# Patient Record
Sex: MALE | Race: WHITE | NOT HISPANIC OR LATINO | Employment: FULL TIME | URBAN - METROPOLITAN AREA
[De-identification: names, ages, dates, MRNs, and addresses within clinical notes are randomized per-mention and may not be internally consistent; named-entity substitution may affect disease eponyms.]

---

## 2021-05-24 ENCOUNTER — HOSPITAL ENCOUNTER (EMERGENCY)
Facility: HOSPITAL | Age: 36
Discharge: HOME | End: 2021-05-24
Attending: EMERGENCY MEDICINE | Admitting: EMERGENCY MEDICINE

## 2021-05-24 ENCOUNTER — APPOINTMENT (OUTPATIENT)
Dept: GENERAL RADIOLOGY | Facility: HOSPITAL | Age: 36
End: 2021-05-24

## 2021-05-24 VITALS
TEMPERATURE: 97.4 F | SYSTOLIC BLOOD PRESSURE: 116 MMHG | OXYGEN SATURATION: 97 % | HEIGHT: 71 IN | RESPIRATION RATE: 20 BRPM | BODY MASS INDEX: 29.14 KG/M2 | WEIGHT: 208.11 LBS | DIASTOLIC BLOOD PRESSURE: 81 MMHG | HEART RATE: 60 BPM

## 2021-05-24 DIAGNOSIS — B34.8 RHINOVIRUS INFECTION: Primary | ICD-10-CM

## 2021-05-24 LAB
B PARAPERT DNA SPEC QL NAA+PROBE: NOT DETECTED
B PERT DNA SPEC QL NAA+PROBE: NOT DETECTED
C PNEUM DNA NPH QL NAA+NON-PROBE: NOT DETECTED
FLUAV SUBTYP SPEC NAA+PROBE: NOT DETECTED
FLUBV RNA ISLT QL NAA+PROBE: NOT DETECTED
HADV DNA SPEC NAA+PROBE: NOT DETECTED
HCOV 229E RNA SPEC QL NAA+PROBE: NOT DETECTED
HCOV HKU1 RNA SPEC QL NAA+PROBE: NOT DETECTED
HCOV NL63 RNA SPEC QL NAA+PROBE: NOT DETECTED
HCOV OC43 RNA SPEC QL NAA+PROBE: NOT DETECTED
HMPV RNA NPH QL NAA+NON-PROBE: NOT DETECTED
HPIV1 RNA SPEC QL NAA+PROBE: NOT DETECTED
HPIV2 RNA SPEC QL NAA+PROBE: NOT DETECTED
HPIV3 RNA NPH QL NAA+PROBE: NOT DETECTED
HPIV4 P GENE NPH QL NAA+PROBE: NOT DETECTED
M PNEUMO IGG SER IA-ACNC: NOT DETECTED
QT INTERVAL: 397 MS
RHINOVIRUS RNA SPEC NAA+PROBE: DETECTED
RSV RNA NPH QL NAA+NON-PROBE: NOT DETECTED
SARS-COV-2 RNA NPH QL NAA+NON-PROBE: NOT DETECTED

## 2021-05-24 PROCEDURE — 0202U NFCT DS 22 TRGT SARS-COV-2: CPT | Performed by: EMERGENCY MEDICINE

## 2021-05-24 PROCEDURE — 71045 X-RAY EXAM CHEST 1 VIEW: CPT

## 2021-05-24 PROCEDURE — 99283 EMERGENCY DEPT VISIT LOW MDM: CPT

## 2021-05-24 PROCEDURE — 93005 ELECTROCARDIOGRAM TRACING: CPT | Performed by: EMERGENCY MEDICINE

## 2021-05-24 RX ORDER — PREDNISONE 20 MG/1
60 TABLET ORAL DAILY
Qty: 15 TABLET | Refills: 0 | Status: SHIPPED | OUTPATIENT
Start: 2021-05-24 | End: 2021-05-29

## 2021-05-25 LAB — QT INTERVAL: 412 MS

## 2021-07-09 PROCEDURE — 99283 EMERGENCY DEPT VISIT LOW MDM: CPT

## 2021-07-09 PROCEDURE — 96374 THER/PROPH/DIAG INJ IV PUSH: CPT

## 2021-07-10 ENCOUNTER — HOSPITAL ENCOUNTER (EMERGENCY)
Facility: HOSPITAL | Age: 36
Discharge: HOME | End: 2021-07-10
Admitting: EMERGENCY MEDICINE

## 2021-07-10 ENCOUNTER — APPOINTMENT (OUTPATIENT)
Dept: CT IMAGING | Facility: HOSPITAL | Age: 36
End: 2021-07-10

## 2021-07-10 VITALS
BODY MASS INDEX: 29.1 KG/M2 | DIASTOLIC BLOOD PRESSURE: 71 MMHG | HEIGHT: 71 IN | RESPIRATION RATE: 15 BRPM | WEIGHT: 207.89 LBS | TEMPERATURE: 98.5 F | OXYGEN SATURATION: 95 % | HEART RATE: 73 BPM | SYSTOLIC BLOOD PRESSURE: 123 MMHG

## 2021-07-10 DIAGNOSIS — D72.829 LEUKOCYTOSIS, UNSPECIFIED TYPE: ICD-10-CM

## 2021-07-10 DIAGNOSIS — R10.30 LOWER ABDOMINAL PAIN: Primary | ICD-10-CM

## 2021-07-10 LAB
ALBUMIN SERPL-MCNC: 4.2 G/DL (ref 3.5–5.2)
ALBUMIN/GLOB SERPL: 1.8 G/DL
ALP SERPL-CCNC: 49 U/L (ref 39–117)
ALT SERPL W P-5'-P-CCNC: 14 U/L (ref 1–41)
ANION GAP SERPL CALCULATED.3IONS-SCNC: 11 MMOL/L (ref 5–15)
AST SERPL-CCNC: 24 U/L (ref 1–40)
BASOPHILS # BLD AUTO: 0 10*3/MM3 (ref 0–0.2)
BASOPHILS NFR BLD AUTO: 0.3 % (ref 0–1.5)
BILIRUB SERPL-MCNC: 0.4 MG/DL (ref 0–1.2)
BILIRUB UR QL STRIP: NEGATIVE
BUN SERPL-MCNC: 14 MG/DL (ref 6–20)
BUN/CREAT SERPL: 13.6 (ref 7–25)
CALCIUM SPEC-SCNC: 9.1 MG/DL (ref 8.6–10.5)
CHLORIDE SERPL-SCNC: 102 MMOL/L (ref 98–107)
CLARITY UR: CLEAR
CO2 SERPL-SCNC: 26 MMOL/L (ref 22–29)
COLOR UR: YELLOW
CREAT SERPL-MCNC: 1.03 MG/DL (ref 0.76–1.27)
DEPRECATED RDW RBC AUTO: 42 FL (ref 37–54)
EOSINOPHIL # BLD AUTO: 0.2 10*3/MM3 (ref 0–0.4)
EOSINOPHIL NFR BLD AUTO: 1 % (ref 0.3–6.2)
ERYTHROCYTE [DISTWIDTH] IN BLOOD BY AUTOMATED COUNT: 13.1 % (ref 12.3–15.4)
GFR SERPL CREATININE-BSD FRML MDRD: 83 ML/MIN/1.73
GLOBULIN UR ELPH-MCNC: 2.3 GM/DL
GLUCOSE SERPL-MCNC: 118 MG/DL (ref 65–99)
GLUCOSE UR STRIP-MCNC: NEGATIVE MG/DL
HCT VFR BLD AUTO: 42.7 % (ref 37.5–51)
HGB BLD-MCNC: 15.1 G/DL (ref 13–17.7)
HGB UR QL STRIP.AUTO: NEGATIVE
KETONES UR QL STRIP: NEGATIVE
LEUKOCYTE ESTERASE UR QL STRIP.AUTO: NEGATIVE
LIPASE SERPL-CCNC: 26 U/L (ref 13–60)
LYMPHOCYTES # BLD AUTO: 1.9 10*3/MM3 (ref 0.7–3.1)
LYMPHOCYTES NFR BLD AUTO: 12.7 % (ref 19.6–45.3)
MCH RBC QN AUTO: 32.8 PG (ref 26.6–33)
MCHC RBC AUTO-ENTMCNC: 35.3 G/DL (ref 31.5–35.7)
MCV RBC AUTO: 93 FL (ref 79–97)
MONOCYTES # BLD AUTO: 1.4 10*3/MM3 (ref 0.1–0.9)
MONOCYTES NFR BLD AUTO: 9.4 % (ref 5–12)
NEUTROPHILS NFR BLD AUTO: 11.4 10*3/MM3 (ref 1.7–7)
NEUTROPHILS NFR BLD AUTO: 76.6 % (ref 42.7–76)
NITRITE UR QL STRIP: NEGATIVE
NRBC BLD AUTO-RTO: 0 /100 WBC (ref 0–0.2)
PH UR STRIP.AUTO: 6 [PH] (ref 5–8)
PLATELET # BLD AUTO: 162 10*3/MM3 (ref 140–450)
PMV BLD AUTO: 9.7 FL (ref 6–12)
POTASSIUM SERPL-SCNC: 3.9 MMOL/L (ref 3.5–5.2)
PROT SERPL-MCNC: 6.5 G/DL (ref 6–8.5)
PROT UR QL STRIP: NEGATIVE
RBC # BLD AUTO: 4.59 10*6/MM3 (ref 4.14–5.8)
SODIUM SERPL-SCNC: 139 MMOL/L (ref 136–145)
SP GR UR STRIP: 1.02 (ref 1–1.03)
UROBILINOGEN UR QL STRIP: NORMAL
WBC # BLD AUTO: 14.9 10*3/MM3 (ref 3.4–10.8)

## 2021-07-10 PROCEDURE — 25010000002 MORPHINE PER 10 MG: Performed by: NURSE PRACTITIONER

## 2021-07-10 PROCEDURE — 0 IOPAMIDOL PER 1 ML: Performed by: NURSE PRACTITIONER

## 2021-07-10 PROCEDURE — 74177 CT ABD & PELVIS W/CONTRAST: CPT

## 2021-07-10 PROCEDURE — 81003 URINALYSIS AUTO W/O SCOPE: CPT | Performed by: EMERGENCY MEDICINE

## 2021-07-10 PROCEDURE — 83690 ASSAY OF LIPASE: CPT | Performed by: NURSE PRACTITIONER

## 2021-07-10 PROCEDURE — 85025 COMPLETE CBC W/AUTO DIFF WBC: CPT | Performed by: NURSE PRACTITIONER

## 2021-07-10 PROCEDURE — 80053 COMPREHEN METABOLIC PANEL: CPT | Performed by: NURSE PRACTITIONER

## 2021-07-10 PROCEDURE — 96374 THER/PROPH/DIAG INJ IV PUSH: CPT

## 2021-07-10 RX ORDER — MORPHINE SULFATE 4 MG/ML
4 INJECTION, SOLUTION INTRAMUSCULAR; INTRAVENOUS ONCE
Status: COMPLETED | OUTPATIENT
Start: 2021-07-10 | End: 2021-07-10

## 2021-07-10 RX ORDER — DICYCLOMINE HCL 20 MG
20 TABLET ORAL EVERY 8 HOURS PRN
Qty: 10 TABLET | Refills: 0 | Status: SHIPPED | OUTPATIENT
Start: 2021-07-10

## 2021-07-10 RX ORDER — SODIUM CHLORIDE 0.9 % (FLUSH) 0.9 %
10 SYRINGE (ML) INJECTION AS NEEDED
Status: DISCONTINUED | OUTPATIENT
Start: 2021-07-10 | End: 2021-07-10 | Stop reason: HOSPADM

## 2021-07-10 RX ADMIN — MORPHINE SULFATE 4 MG: 4 INJECTION INTRAVENOUS at 02:49

## 2021-07-10 RX ADMIN — IOPAMIDOL 100 ML: 755 INJECTION, SOLUTION INTRAVENOUS at 03:39

## 2021-07-10 NOTE — ED PROVIDER NOTES
Subjective   Patient presents with:  Abdominal Pain: lower abdominal pain x 5 hours    Provider, No Known  No LMP for male patient.   -- Augmentin (Amoxicillin-Pot Clavulanate) -- Hives  Onset: 5 hours  Provoking: None  Quality: Sharp  Region & Radiation: Lower abdomen  Severity: Moderate  Time: Consistent  Context:  Patient is a 36-year-old male, presents emergency department complaint of abdominal pain onset 5 hours ago.  Patient reports associated diarrhea.  No hematochezia or melena.  No hematemesis or coffee-ground emesis.  No vomiting.  No urinary symptoms.  Patient reports his pain radiates across his lower abdomen.  He does not have any testicular pain or swelling.  No concern for STDs.  Last bowel movement was today.    Patient denies any recent travel.  No suspicious foods.          Review of Systems   Constitutional: Negative for chills and fever.   Respiratory: Negative for chest tightness and shortness of breath.    Cardiovascular: Negative for chest pain.   Gastrointestinal: Positive for abdominal pain and diarrhea. Negative for nausea and vomiting.   Genitourinary: Negative for flank pain.   Musculoskeletal: Negative for back pain and neck pain.   Skin: Negative for color change and rash.   Neurological: Negative for light-headedness.       No past medical history on file.    Allergies   Allergen Reactions   • Augmentin [Amoxicillin-Pot Clavulanate] Hives       No past surgical history on file.    No family history on file.    Social History     Socioeconomic History   • Marital status: Significant Other     Spouse name: Not on file   • Number of children: Not on file   • Years of education: Not on file   • Highest education level: Not on file           Objective   Physical Exam  Vitals and nursing note reviewed.   Constitutional:       General: He is not in acute distress.     Appearance: He is well-developed. He is not ill-appearing, toxic-appearing or diaphoretic.   HENT:      Head: Normocephalic  "and atraumatic.      Mouth/Throat:      Mouth: Mucous membranes are moist.      Pharynx: Oropharynx is clear.   Eyes:      Extraocular Movements: Extraocular movements intact.      Pupils: Pupils are equal, round, and reactive to light.   Cardiovascular:      Rate and Rhythm: Normal rate and regular rhythm.      Heart sounds: No murmur heard.   No friction rub. No gallop.    Pulmonary:      Effort: Pulmonary effort is normal.      Breath sounds: Normal breath sounds.   Abdominal:      General: Abdomen is protuberant. Bowel sounds are normal. There is no distension.      Palpations: Abdomen is soft.      Tenderness: There is abdominal tenderness in the right lower quadrant, suprapubic area and left lower quadrant. There is no guarding or rebound. Negative signs include Arrieta's sign and Rovsing's sign.   Skin:     General: Skin is warm.   Neurological:      General: No focal deficit present.      Mental Status: He is alert and oriented to person, place, and time.   Psychiatric:         Mood and Affect: Mood normal.         Behavior: Behavior normal.         Procedures           ED Course  ED Course as of Jul 10 0418   Sat Jul 10, 2021   0412 Pt feels improved. Abdominal exam improved. No peritoneal signs.     [LB]      ED Course User Index  [LB] Carmelita Ward M, APRN      /71   Pulse 73   Temp 98.5 °F (36.9 °C) (Oral)   Resp 15   Ht 180.3 cm (71\")   Wt 94.3 kg (207 lb 14.3 oz)   SpO2 95%   BMI 29.00 kg/m²   Labs Reviewed   COMPREHENSIVE METABOLIC PANEL - Abnormal; Notable for the following components:       Result Value    Glucose 118 (*)     All other components within normal limits    Narrative:     GFR Normal >60  Chronic Kidney Disease <60  Kidney Failure <15     CBC WITH AUTO DIFFERENTIAL - Abnormal; Notable for the following components:    WBC 14.90 (*)     Neutrophil % 76.6 (*)     Lymphocyte % 12.7 (*)     Neutrophils, Absolute 11.40 (*)     Monocytes, Absolute 1.40 (*)     All other components " within normal limits   URINALYSIS W/ CULTURE IF INDICATED - Normal    Narrative:     Urine microscopic not indicated.   LIPASE - Normal   CBC AND DIFFERENTIAL    Narrative:     The following orders were created for panel order CBC & Differential.  Procedure                               Abnormality         Status                     ---------                               -----------         ------                     CBC Auto Differential[[id number]]        Abnormal            Final result                 Please view results for these tests on the individual orders.     Medications   sodium chloride 0.9 % flush 10 mL (has no administration in time range)   Morphine sulfate (PF) injection 4 mg (4 mg Intravenous Given 7/10/21 3356)   iopamidol (ISOVUE-370) 76 % injection 100 mL (100 mL Intravenous Given 7/10/21 7840)     No radiology results for the last day                                       MDM  Number of Diagnoses or Management Options  Leukocytosis, unspecified type  Lower abdominal pain  Diagnosis management comments: Appropriate PPE was worn during the duration of the care for this patient while in the emergency department per [facility] Policy    ----Differentials: Constipation, diverticulitis, bowel obstruction  This list is not all inclusive and does not constitute the entireity of considered causes.     ----Lab and imaging reviewd by me, imaging interpreted per radiologist and independly viewed by me:l eukocytosis.  No acute findings on CT.  Lab work otherwise unremarkable.    ED  Course----Patient was brought back to the emergency department room for evaluation and placed on appropriate monitoring.      Vital signs have  been reviewed. Patient is afebrile. Non toxic in appearance. Alert and oriented to person, place, time and situation.  Work-up here in the ED is reassuring, he does have a leukocytosis at 10.9, his other work-up is unremarkable.  On reexam his abdominal tenderness is improved.  He  has no evidence for an acute abdomen.  I have advised follow-up with the PCP for recheck of his white blood cell count.  Given information to follow-up with the PCP and get established for care.    I spoke with the patient at the bedside regarding their plan of care, discharge instruction, home care, prescriptions, and importance follow-up.  We discussed test results at the bedside, including incidental abnormal labs, radiological findings, understands need for follow-up with primary care or specialist if indicated.      Patient was made aware of indications to return to the emergency department.  Patient agrees with the current plan of care for discharge, verbalized understanding of all instructions    Pt is aware that discharge does not mean that nothing is wrong but it indicates no emergency is present and they must continue care with follow-up as given below or physician of their choice                               Amount and/or Complexity of Data Reviewed  Clinical lab tests: reviewed  Tests in the radiology section of CPT®: reviewed  Decide to obtain previous medical records or to obtain history from someone other than the patient: yes    Patient Progress  Patient progress: stable      Final diagnoses:   Lower abdominal pain   Leukocytosis, unspecified type       ED Disposition  ED Disposition     ED Disposition Condition Comment    Discharge Stable           PATIENT [facility] [address] [phone]  Schedule an appointment as soon as possible for a visit   Call for assistance with follow up with Primary care provider-call tomorrow.    [facility] EMERGENCY DEPARTMENT  [address] [address] [phone]    As needed, If symptoms worsen    Scottie Durham MD  [address] [facility] [address] [phone]    Schedule an appointment as soon as possible for a visit   for primary care follow up         Medication List      New Prescriptions     dicyclomine 20 MG tablet  Commonly known as: BENTYL  Take 1 tablet by mouth Every 8 (Eight) Hours As Needed (abdominal pain).           Where to Get Your Medications      These medications were sent to [id number] - [place], [place] - [address] AT [address] - [phone]  - [phone] FX  [address] [place] [address]    Phone: [phone]   · dicyclomine 20 MG tablet          Carmeliat Ward, APRN  07/10/21 0418     Roderick Navarro(Attending)

## 2021-07-10 NOTE — DISCHARGE INSTRUCTIONS
Please follow-up with your primary care provider, call tomorrow for an appointment, if you do not have a primary care provider, please use the patient connection above to us to help establish care, please call as soon as possible.    Return the emergency department for new or worsening symptoms    Take medications as prescribed, any changes will be noted on your discharge instruction    Please ensure you follow up with primary care for a recheck of your white blood cell count

## 2021-07-26 PROCEDURE — 99283 EMERGENCY DEPT VISIT LOW MDM: CPT

## 2021-07-27 ENCOUNTER — HOSPITAL ENCOUNTER (EMERGENCY)
Facility: HOSPITAL | Age: 36
Discharge: HOME | End: 2021-07-27
Admitting: EMERGENCY MEDICINE

## 2021-07-27 VITALS
HEIGHT: 71 IN | WEIGHT: 203.26 LBS | BODY MASS INDEX: 28.46 KG/M2 | RESPIRATION RATE: 16 BRPM | DIASTOLIC BLOOD PRESSURE: 81 MMHG | HEART RATE: 72 BPM | TEMPERATURE: 98.6 F | SYSTOLIC BLOOD PRESSURE: 138 MMHG | OXYGEN SATURATION: 99 %

## 2021-07-27 DIAGNOSIS — L23.7 POISON IVY DERMATITIS: Primary | ICD-10-CM

## 2021-07-27 PROCEDURE — 63710000001 PREDNISONE PER 1 MG: Performed by: PHYSICIAN ASSISTANT

## 2021-07-27 RX ORDER — PREDNISONE 20 MG/1
40 TABLET ORAL DAILY
Qty: 10 TABLET | Refills: 0 | Status: SHIPPED | OUTPATIENT
Start: 2021-07-27

## 2021-07-27 RX ORDER — CLOBETASOL PROPIONATE 0.5 MG/G
CREAM TOPICAL 2 TIMES DAILY
Qty: 30 G | Refills: 0 | Status: SHIPPED | OUTPATIENT
Start: 2021-07-27

## 2021-07-27 RX ORDER — FAMOTIDINE 20 MG/1
20 TABLET, FILM COATED ORAL ONCE
Status: COMPLETED | OUTPATIENT
Start: 2021-07-27 | End: 2021-07-27

## 2021-07-27 RX ORDER — PREDNISONE 20 MG/1
40 TABLET ORAL ONCE
Status: COMPLETED | OUTPATIENT
Start: 2021-07-27 | End: 2021-07-27

## 2021-07-27 RX ORDER — FAMOTIDINE 40 MG/1
40 TABLET, FILM COATED ORAL DAILY
Qty: 10 TABLET | Refills: 0 | Status: SHIPPED | OUTPATIENT
Start: 2021-07-27

## 2021-07-27 RX ADMIN — FAMOTIDINE 20 MG: 20 TABLET, FILM COATED ORAL at 02:03

## 2021-07-27 RX ADMIN — PREDNISONE 40 MG: 20 TABLET ORAL at 02:03

## 2022-07-26 ENCOUNTER — HOSPITAL ENCOUNTER (EMERGENCY)
Facility: HOSPITAL | Age: 37
Discharge: LEFT BEFORE DISCHARGE | End: 2022-07-26

## 2022-07-26 VITALS
HEIGHT: 71 IN | DIASTOLIC BLOOD PRESSURE: 78 MMHG | TEMPERATURE: 97 F | OXYGEN SATURATION: 99 % | BODY MASS INDEX: 28.46 KG/M2 | HEART RATE: 57 BPM | WEIGHT: 203.26 LBS | SYSTOLIC BLOOD PRESSURE: 114 MMHG | RESPIRATION RATE: 18 BRPM

## 2022-07-26 PROCEDURE — 99211 OFF/OP EST MAY X REQ PHY/QHP: CPT

## 2023-04-26 ENCOUNTER — HOSPITAL ENCOUNTER (EMERGENCY)
Facility: HOSPITAL | Age: 38
Discharge: HOME | End: 2023-04-26
Attending: EMERGENCY MEDICINE
Payer: MEDICAID

## 2023-04-26 VITALS
RESPIRATION RATE: 16 BRPM | OXYGEN SATURATION: 96 % | SYSTOLIC BLOOD PRESSURE: 117 MMHG | DIASTOLIC BLOOD PRESSURE: 73 MMHG | BODY MASS INDEX: 28.98 KG/M2 | TEMPERATURE: 98.6 F | HEART RATE: 61 BPM | WEIGHT: 207.01 LBS | HEIGHT: 71 IN

## 2023-04-26 DIAGNOSIS — L03.811 CELLULITIS OF HEAD EXCEPT FACE: ICD-10-CM

## 2023-04-26 DIAGNOSIS — L02.91 ABSCESS: Primary | ICD-10-CM

## 2023-04-26 PROCEDURE — 87205 SMEAR GRAM STAIN: CPT | Performed by: EMERGENCY MEDICINE

## 2023-04-26 PROCEDURE — 0 LIDOCAINE 1 % SOLUTION: Performed by: PHYSICIAN ASSISTANT

## 2023-04-26 PROCEDURE — 87186 SC STD MICRODIL/AGAR DIL: CPT | Performed by: EMERGENCY MEDICINE

## 2023-04-26 PROCEDURE — 99283 EMERGENCY DEPT VISIT LOW MDM: CPT

## 2023-04-26 PROCEDURE — 87070 CULTURE OTHR SPECIMN AEROBIC: CPT | Performed by: EMERGENCY MEDICINE

## 2023-04-26 RX ORDER — LIDOCAINE HYDROCHLORIDE 10 MG/ML
10 INJECTION, SOLUTION INFILTRATION; PERINEURAL ONCE
Status: COMPLETED | OUTPATIENT
Start: 2023-04-26 | End: 2023-04-26

## 2023-04-26 RX ORDER — DOXYCYCLINE 100 MG/1
100 TABLET ORAL ONCE
Status: COMPLETED | OUTPATIENT
Start: 2023-04-26 | End: 2023-04-26

## 2023-04-26 RX ORDER — LIDOCAINE 40 MG/G
1 CREAM TOPICAL AS NEEDED
Status: DISCONTINUED | OUTPATIENT
Start: 2023-04-26 | End: 2023-04-26 | Stop reason: HOSPADM

## 2023-04-26 RX ORDER — DOXYCYCLINE 100 MG/1
100 TABLET ORAL 2 TIMES DAILY
Qty: 14 TABLET | Refills: 0 | Status: SHIPPED | OUTPATIENT
Start: 2023-04-26

## 2023-04-26 RX ADMIN — DOXYCYCLINE 100 MG: 100 TABLET ORAL at 20:07

## 2023-04-26 RX ADMIN — LIDOCAINE 4% 1 APPLICATION: 4 CREAM TOPICAL at 19:19

## 2023-04-26 RX ADMIN — LIDOCAINE HYDROCHLORIDE 10 ML: 10 INJECTION, SOLUTION INFILTRATION; PERINEURAL at 19:47

## 2023-04-26 NOTE — ED PROVIDER NOTES
Subjective   History of Present Illness     Patient is a 37-year-old male comes in complaining of apparent abscess of the right base of skull neck area.  Patient states he noticed that yesterday as just within the hairline in the back.  Patient states he will often get pimples after having haircuts in this area but never this bad.  Patient states that he was putting on his shirt and area had brushed up against the apparent abscess and cause severe pain.  Patient denies any fever, chills, nausea, vomiting.  Patient denies any history of diabetes.  Patient denies any history of allergy to lidocaine or any other antibiotic other than Augmentin.    Upon chart review, no prior notes available to review although patient was last seen in ER on 7/10/2021 for abdominal pain.       Review of Systems   Constitutional: Negative for chills, fatigue and fever.   HENT: Negative for congestion, sore throat, tinnitus and trouble swallowing.    Eyes: Negative for photophobia, discharge and visual disturbance.   Respiratory: Negative for cough, shortness of breath and wheezing.    Cardiovascular: Negative for chest pain, palpitations and leg swelling.   Gastrointestinal: Negative for abdominal pain, blood in stool, diarrhea, nausea and vomiting.   Genitourinary: Negative for dysuria, flank pain, frequency and urgency.   Musculoskeletal: Negative for arthralgias and myalgias.   Skin: Positive for rash and wound.   Neurological: Negative for dizziness and headaches.   Psychiatric/Behavioral: Negative for confusion.       History reviewed. No pertinent past medical history.    Allergies   Allergen Reactions   • Augmentin [Amoxicillin-Pot Clavulanate] Hives       History reviewed. No pertinent surgical history.    History reviewed. No pertinent family history.    Social History     Socioeconomic History   • Marital status: Significant Other           Objective   Physical Exam  Vitals and nursing note reviewed.   Constitutional:        General: He is not in acute distress.     Appearance: Normal appearance. He is well-developed. He is not diaphoretic.   HENT:      Head: Normocephalic and atraumatic.      Right Ear: External ear normal.      Left Ear: External ear normal.      Nose: Nose normal.      Mouth/Throat:      Mouth: Mucous membranes are moist.   Eyes:      Extraocular Movements: Extraocular movements intact.      Conjunctiva/sclera: Conjunctivae normal.      Pupils: Pupils are equal, round, and reactive to light.   Cardiovascular:      Rate and Rhythm: Normal rate and regular rhythm.      Pulses: Normal pulses.      Heart sounds: Normal heart sounds.      Comments: S1, S2 audible.  Pulmonary:      Effort: Pulmonary effort is normal. No respiratory distress.      Breath sounds: Normal breath sounds. No wheezing, rhonchi or rales.      Comments: On room air.  Abdominal:      General: Bowel sounds are normal. There is no distension.      Palpations: Abdomen is soft.      Tenderness: There is no abdominal tenderness. There is no guarding or rebound.      Hernia: No hernia is present.   Musculoskeletal:         General: No tenderness or deformity. Normal range of motion.      Cervical back: Normal range of motion.      Right lower leg: No edema.      Left lower leg: No edema.   Skin:     General: Skin is warm.      Capillary Refill: Capillary refill takes less than 2 seconds.      Findings: No erythema or rash.      Comments: Patient has about a 2 cm in diameter area of erythema and fluctuance as well as central induration.  This area is movable above the bones of the scalp.  Patient appears to have old healed areas of folliculitis next to this of various sizes.   Neurological:      General: No focal deficit present.      Mental Status: He is alert and oriented to person, place, and time.      Cranial Nerves: No cranial nerve deficit.   Psychiatric:         Mood and Affect: Mood normal.         Behavior: Behavior normal.         Incision &  "Drainage    Date/Time: 4/26/2023 9:39 PM  Performed by: Moses Melissa PA  Authorized by: Antonio Tyson MD     Consent:     Consent obtained:  Verbal    Consent given by:  Patient    Risks, benefits, and alternatives were discussed: yes      Risks discussed:  Damage to other organs, bleeding, incomplete drainage, infection and pain    Alternatives discussed:  No treatment  Universal protocol:     Patient identity confirmed:  Verbally with patient and arm band  Location:     Type:  Abscess    Size:  2 cm    Location:  Head    Head location:  Scalp  Pre-procedure details:     Skin preparation:  Antiseptic wash  Sedation:     Sedation type:  Moderate sedation  Procedure type:     Complexity:  Simple  Procedure details:     Incision types:  Single straight    Incision depth:  Subcutaneous    Wound management:  Probed and deloculated    Drainage:  Bloody and purulent    Drainage amount:  Moderate    Wound treatment:  Wound left open  Post-procedure details:     Procedure completion:  Tolerated with difficulty               ED Course      /73   Pulse 61   Temp 98.6 °F (37 °C) (Temporal)   Resp 16   Ht 180.3 cm (71\")   Wt 93.9 kg (207 lb 0.2 oz)   SpO2 96%   BMI 28.87 kg/m²   Labs Reviewed   WOUND CULTURE     No radiology results for the last day                                       MDM     Differential diagnosis: Cellulitis, abscess, cyst, not ment to be an all-inclusive list    Chart review: Upon chart review, I had seen the patient back in July 2021 for poison ivy rash.  On the ER notes to review at this time.    EKG:  Not indicated    Imaging: Considered but not indicated  Labs: Wound culture obtained.    Vitals:  /73   Pulse 61   Temp 98.6 °F (37 °C) (Temporal)   Resp 16   Ht 180.3 cm (71\")   Wt 93.9 kg (207 lb 0.2 oz)   SpO2 96%   BMI 28.87 kg/m²     Medications given:    Medications   lidocaine (LMX) 4 % cream 1 application (1 application Topical Given 4/26/23 1919)   lidocaine (XYLOCAINE) 1 " % injection 10 mL (10 mL Injection Given 4/26/23 1947)   doxycycline (ADOXA) tablet 100 mg (100 mg Oral Given 4/26/23 2007)       Procedures:  I and D above.    MDM: Patient is a 37-year-old male who comes in complaining of pain and swelling at the hairline on the back of his head/neck.  Patient states he noticed it yesterday.  Patient was given LMX cream as the area of abscess is in a sensitive area.  Patient was also given local lidocaine as well.  Patient had I&D performed successfully as above procedure described.  Patient had no fever, chills, nausea or vomiting.  Patient is given doxycycline here and sent home on a 7-day course.  Wound culture obtained as well.  Patient given strict return precautions and voiced understanding. See full discharge instructions for further details.  Results and plan discussed with patient and is agreeable with plan.    Final diagnoses:   Abscess   Cellulitis of head except face       ED Disposition  ED Disposition     ED Disposition   Discharge    Condition   Stable    Comment   --             [facility] EMERGENCY DEPARTMENT  [address] [facility] [address] [phone]  Go in 1 day  As needed, If symptoms worsen    PATIENT CONNECTION - [facility] [address]  [phone]  Call in 1 week  For wound re-check    Martina Rodriguez MD  [address] [place] [address] [phone]    Schedule an appointment as soon as possible for a visit in 1 week  For wound re-check, As needed         Medication List      New Prescriptions    doxycycline 100 MG tablet  Commonly known as: ADOXA  Take 1 tablet by mouth 2 (Two) Times a Day.           Where to Get Your Medications      These medications were sent to [id number] - [place], [place] - [address] AT [address] - [phone]  - [phone] FX  [address] [place] [address]    Phone: [phone]   · doxycycline 100 MG tablet          Moses Melissa  PA  04/26/23 2140       Moses Melissa PA  04/26/23 2141

## 2023-04-26 NOTE — ED NOTES
Patient reports he woke up from a nap and noticed a swollen painful area at his hairline on the back of his head. Patient reports sometimes he gets ingrown hairs but doesn't think this is that. Patient reports pain at site. Red, swollen area noted to back of head at hairline on the right side about the size of a nickel.

## 2023-04-27 NOTE — DISCHARGE INSTRUCTIONS
Please take antibiotic to completion even if feeling better.  Please use warm compresses 3-4 times a day for 10 to 15 minutes at a time of a warm wash rag holding pressure to the area.  Please come back to the ER if spike high fever or develop vomiting as you will need reevaluation at time.  Please call the patient connection number above to set up a primary care provider if you do not have a primary care provider already.  Please follow-up with Dr. Rodriguez with general surgery if symptoms persist or worsen.

## 2023-04-28 LAB
BACTERIA SPEC AEROBE CULT: ABNORMAL
GRAM STN SPEC: ABNORMAL
GRAM STN SPEC: ABNORMAL

## 2023-04-28 RX ORDER — CEFDINIR 300 MG/1
300 CAPSULE ORAL 2 TIMES DAILY
Qty: 14 CAPSULE | Refills: 0 | Status: SHIPPED | OUTPATIENT
Start: 2023-04-28

## 2023-04-28 NOTE — PROGRESS NOTES
Patient was seen in ED on 4/26 for abscess of right base skull neck area. I&D was performed during ED visit. Patient wound culture resulted with P. mirabilis. Susceptible to Cephalosporins (3rd gen). Patient was given Rx for doxycycline. Therapy is insufficient coverage. Discussed with Wilfred Davey. New prescription for cefdinir called to the patient's preferred pharmacy: [facility]. Spoke with patient who was busy at work and asked to call back after 5 pm today. Will call the patient after 5 pm today and  on stopping doxycycline and initiating cefdinir.     4/28 @ 18:26: Attempted to get ahold of patient - no answer, left voicemail explaining that antimicrobial therapy has been changed and that he needs to  new antibiotic and stop taking the old one. Provided number to call back if questions arise.    Bia Peralta, PharmD, BCPS  04/28/23 18:25 EDT      Microbiology Results (last 10 days)       Procedure Component Value - Date/Time    Wound Culture - Swab, Neck [[id number]]  (Abnormal)  (Susceptibility) Collected: 04/26/23 1954    Lab Status: Final result Specimen: Swab from Neck Updated: 04/28/23 0646     Wound Culture Scant growth (1+) Proteus mirabilis     Gram Stain Many (4+) WBCs per low power field      No organisms seen    Susceptibility        Proteus mirabilis      ANA PAULA      Ampicillin Susceptible      Ampicillin + Sulbactam Susceptible      Cefepime Susceptible      Ceftazidime Susceptible      Ceftriaxone Susceptible      Gentamicin Susceptible      Levofloxacin Susceptible      Piperacillin + Tazobactam Susceptible      Tetracycline Resistant      Trimethoprim + Sulfamethoxazole Susceptible                       Susceptibility Comments       Proteus mirabilis    Cefazolin sensitivity will not be reported for Enterobacteriaceae in non-urine isolates. If cefazolin is preferred, please call the microbiology lab to request an E-test.  With the exception of urinary-sourced  infections, aminoglycosides should not be used as monotherapy.                       Bia Peralta, PharmD  4/28/2023 11:14 EDT

## 2023-05-13 ENCOUNTER — HOSPITAL ENCOUNTER (EMERGENCY)
Facility: HOSPITAL | Age: 38
Discharge: HOME | End: 2023-05-13
Attending: EMERGENCY MEDICINE
Payer: MEDICAID

## 2023-05-13 VITALS
TEMPERATURE: 97.5 F | OXYGEN SATURATION: 98 % | WEIGHT: 209.22 LBS | SYSTOLIC BLOOD PRESSURE: 139 MMHG | DIASTOLIC BLOOD PRESSURE: 93 MMHG | HEIGHT: 71 IN | HEART RATE: 61 BPM | BODY MASS INDEX: 29.29 KG/M2 | RESPIRATION RATE: 18 BRPM

## 2023-05-13 DIAGNOSIS — T15.02XA FOREIGN BODY OF LEFT CORNEA, INITIAL ENCOUNTER: Primary | ICD-10-CM

## 2023-05-13 PROCEDURE — 99283 EMERGENCY DEPT VISIT LOW MDM: CPT

## 2023-05-13 RX ORDER — SULFACETAMIDE SODIUM 100 MG/ML
1 SOLUTION/ DROPS OPHTHALMIC
Status: DISCONTINUED | OUTPATIENT
Start: 2023-05-13 | End: 2023-05-13 | Stop reason: HOSPADM

## 2023-05-13 RX ORDER — PROPARACAINE HYDROCHLORIDE 5 MG/ML
2 SOLUTION/ DROPS OPHTHALMIC ONCE
Status: COMPLETED | OUTPATIENT
Start: 2023-05-13 | End: 2023-05-13

## 2023-05-13 RX ADMIN — PROPARACAINE HYDROCHLORIDE 2 DROP: 5 SOLUTION/ DROPS OPHTHALMIC at 01:15

## 2023-05-13 RX ADMIN — FLUORESCEIN SODIUM 1 STRIP: 1 STRIP OPHTHALMIC at 01:15

## 2023-05-13 RX ADMIN — SULFACETAMIDE SODIUM 1 DROP: 100 SOLUTION/ DROPS OPHTHALMIC at 01:32

## 2023-05-13 NOTE — DISCHARGE INSTRUCTIONS
Follow-up with [facility]. [facility] call [phone] this morning for evaluation.  Use sulfacetamide drop every 4-6 hours, 1 drop in each eye.  Return for increased pain, fever, increased swelling or any other concerns

## 2023-05-13 NOTE — ED PROVIDER NOTES
"Subjective   History of Present Illness  37-year-old male states he was working on his car this evening and some rust debris fell into his face as he was loosening a bolt.  He states he rinsed his eyes and was feeling better but then tonight woke up to use the bathroom and noticed that he had some bilateral sensitivity and foreign body sensation.  He denies any welding  Review of Systems    No past medical history on file.    Allergies   Allergen Reactions   • Augmentin [Amoxicillin-Pot Clavulanate] Hives       No past surgical history on file.    No family history on file.    Social History     Socioeconomic History   • Marital status: Significant Other     Prior to Admission medications    Medication Sig Start Date End Date Taking? Authorizing Provider   cefdinir (OMNICEF) 300 MG capsule Take 1 capsule by mouth 2 (Two) Times a Day. 4/28/23   Wilfred Davey PA   clobetasol (TEMOVATE) 0.05 % cream Apply  topically to the appropriate area as directed 2 (Two) Times a Day. 7/27/21   Moses Melissa PA   dicyclomine (BENTYL) 20 MG tablet Take 1 tablet by mouth Every 8 (Eight) Hours As Needed (abdominal pain). 7/10/21   Carmelita Ward APRN   doxycycline (ADOXA) 100 MG tablet Take 1 tablet by mouth 2 (Two) Times a Day. 4/26/23   Moses Melissa PA   famotidine (PEPCID) 40 MG tablet Take 1 tablet by mouth Daily. 7/27/21   Moses Melissa PA   predniSONE (DELTASONE) 20 MG tablet Take 2 tablets by mouth Daily. 7/27/21   Moses Melissa PA     /93   Pulse 61   Temp 97.5 °F (36.4 °C) (Oral)   Resp 18   Ht 180.3 cm (71\")   Wt 94.9 kg (209 lb 3.5 oz)   SpO2 98%   BMI 29.18 kg/m²         Objective   Physical Exam  General: Well-appearing, no acute distress  Psych: Oriented, pleasant affect, mildly anxious  Respirations:  nonlabored respirations  Skin: No erythema  Eyes: He has light sensitivity and some clear tearing from both eyes, conjunctiva slightly injected bilaterally, cornea appears clear, anterior chamber " clear pupils have normal red reflex and normal reactivity, visual acuity grossly intact although patient had difficulty keeping his eyes open due to light sensitivity.  Following proparacaine drops and fluorescein stain was lamp exam reveals no corneal abrasions, slit-lamp exam reveals no apparent foreign body in the right but on the left cornea there is a tiny rust ring there is no apparent residual metallic foreign body a moistened Q-tip was used to lightly brushed the area without any removal of the rest drink being accomplished in no apparent metal piece being present.  Procedures           ED Course                                           MDM  Patient is advised the findings.  He was ordered sulfacetamide drops.  He will be discharged and referred to ophthalmology today.  Patient voiced understanding to the need for specialty follow-up for rust ring removal.  He was given warning signs for return.  Final diagnoses:   Foreign body of left cornea, initial encounter       ED Disposition  ED Disposition     ED Disposition   Discharge    Condition   Stable    Comment   --             No follow-up provider specified.       Medication List      No changes were made to your prescriptions during this visit.          Joe Alejandro MD  05/13/23 0111

## 2023-05-13 NOTE — ED NOTES
Could not obtain visual acuity due to Pt stated that he got rust in both eyes and could not hardly open them without getting very watery.

## 2023-10-22 ENCOUNTER — HOSPITAL ENCOUNTER (EMERGENCY)
Facility: HOSPITAL | Age: 38
Discharge: HOME | End: 2023-10-22
Attending: EMERGENCY MEDICINE | Admitting: EMERGENCY MEDICINE
Payer: MEDICAID

## 2023-10-22 VITALS
HEART RATE: 76 BPM | TEMPERATURE: 97 F | HEIGHT: 71 IN | RESPIRATION RATE: 20 BRPM | SYSTOLIC BLOOD PRESSURE: 142 MMHG | BODY MASS INDEX: 29.38 KG/M2 | WEIGHT: 209.88 LBS | OXYGEN SATURATION: 99 % | DIASTOLIC BLOOD PRESSURE: 85 MMHG

## 2023-10-22 DIAGNOSIS — L02.91 ABSCESS: Primary | ICD-10-CM

## 2023-10-22 PROCEDURE — 87070 CULTURE OTHR SPECIMN AEROBIC: CPT | Performed by: EMERGENCY MEDICINE

## 2023-10-22 PROCEDURE — 87077 CULTURE AEROBIC IDENTIFY: CPT | Performed by: EMERGENCY MEDICINE

## 2023-10-22 PROCEDURE — 87205 SMEAR GRAM STAIN: CPT | Performed by: EMERGENCY MEDICINE

## 2023-10-22 PROCEDURE — 87186 SC STD MICRODIL/AGAR DIL: CPT | Performed by: EMERGENCY MEDICINE

## 2023-10-22 PROCEDURE — 99282 EMERGENCY DEPT VISIT SF MDM: CPT

## 2023-10-22 RX ORDER — SULFAMETHOXAZOLE AND TRIMETHOPRIM 800; 160 MG/1; MG/1
1 TABLET ORAL 2 TIMES DAILY
Qty: 14 TABLET | Refills: 0 | Status: SHIPPED | OUTPATIENT
Start: 2023-10-22

## 2023-10-22 NOTE — ED PROVIDER NOTES
Subjective   History of Present Illness  Chief complaint: Patient is a 38-year-old with history of recurrent abscesses.  He gets them at the base of his neck where he shaves his head.  He states he shaved his head on Thursday began to feel swelling back there by Friday and today with worsening he presented here.  He is concerned to find out what bacteria was causing them.  He has not had fever.  But has had them lanced multiple times in the past and presented for the same.  He states his tetanus is up-to-date.  He is not diabetic.    Context:    Duration:    Timing: As noted above    Severity:     Associated Symptoms:        PCP:  LMP:      Review of Systems   Constitutional:  Negative for fever.   Musculoskeletal:  Positive for neck pain.   Skin:  Positive for wound.   Neurological: Negative.        No past medical history on file.    Allergies   Allergen Reactions    Augmentin [Amoxicillin-Pot Clavulanate] Hives       No past surgical history on file.    No family history on file.    Social History     Socioeconomic History    Marital status: Significant Other           Objective   Physical Exam  Vitals and nursing note reviewed.   Constitutional:       Appearance: Normal appearance.   HENT:      Head:      Comments: As noted in neck exam  Neck:        Comments: Fluctuant area of abscess base of the scalp and neck.  3 cm.  Cardiovascular:      Rate and Rhythm: Normal rate.   Pulmonary:      Effort: Pulmonary effort is normal.   Neurological:      Mental Status: He is alert and oriented to person, place, and time.   Psychiatric:         Mood and Affect: Mood normal.         Thought Content: Thought content normal.         Incision & Drainage    Date/Time: 10/22/2023 9:50 AM    Performed by: Long Adnrade DO  Authorized by: Long Andrade DO    Consent:     Consent obtained:  Verbal    Consent given by:  Patient    Risks, benefits, and alternatives were discussed: yes      Risks discussed:   "Bleeding, incomplete drainage, pain, infection and damage to other organs    Alternatives discussed:  No treatment and delayed treatment  Universal protocol:     Procedure explained and questions answered to patient or proxy's satisfaction: yes      Immediately prior to procedure, a time out was called: yes      Patient identity confirmed:  Verbally with patient  Location:     Type:  Abscess    Size:  3    Location:  Neck    Neck location:  R posterior  Pre-procedure details:     Skin preparation:  Povidone-iodine  Anesthesia:     Anesthesia method:  Local infiltration    Local anesthetic:  Lidocaine 1% w/o epi  Procedure type:     Complexity:  Simple  Procedure details:     Incision types:  Stab incision    Incision depth:  Dermal    Wound management:  Probed and deloculated, irrigated with saline and extensive cleaning    Drainage:  Purulent    Drainage amount:  Moderate    Wound treatment:  Drain placed    Packing materials:  1/4 in iodoform gauze    Amount 1/4\" iodoform:  2.5  Post-procedure details:     Procedure completion:  Tolerated well, no immediate complications             ED Course                                           Medical Decision Making  Patient was seen evaluate for the above problem    Differential diagnosis includes was not limited to abscess, lymphadenopathy    Patient had no significant surrounding cellulitis.  Patient did consent to incision and drainage.  This was done here in the emergency department.  See procedure note.  Patient tolerated procedure well.  I did review his prior sensitivity report.  Bactrim and cephalosporin was sensitive to Proteus.  I will resend culture.  He verbalized understanding of antibiotics.  Will cover with Bactrim as it was susceptible prior and sensitivities to be done.  Discussed follow-up with surgery for definitive treatment.    Amount and/or Complexity of Data Reviewed  External Data Reviewed: labs.     Details: Labs from 4/6/2023 culture and " sensitivity shows sensitivity to Bactrim and resistant to Doxy with Proteus species.  Labs: ordered.    Risk  Prescription drug management.        Final diagnoses:   None     Abscess s/p I&D  ED Disposition  ED Disposition       None            No follow-up provider specified.       Medication List      No changes were made to your prescriptions during this visit.            Long Andrade,   10/22/23 0954       Long Andrade,   10/22/23 0954

## 2023-10-22 NOTE — ED NOTES
Pt reports abscess to right side of his neck that showed up yesterday. Pt reports this has happened in the past.

## 2023-10-24 NOTE — PROGRESS NOTES
Patient presented to the ED on 10/22 for an abscess on his neck, and I&D was performed in the department. Patient wound culture resulted with P. mirabilis and normal skin radha . Susceptible to Sulfonamides. Patient was given Rx for sulfamethoxazole/ trimethoprim. Therapy is appropriate coverage. No further follow-up required.    Microbiology Results (last 10 days)       Procedure Component Value - Date/Time    Wound Culture - Wound, Neck [[id number]]  (Abnormal)  (Susceptibility) Collected: 10/22/23 0951    Lab Status: Final result Specimen: Wound from Neck Updated: 10/26/23 0744     Wound Culture Light growth (2+) Proteus mirabilis      Scant growth (1+) Normal Skin Radha     Gram Stain Many (4+) WBCs seen      No organisms seen    Susceptibility        Proteus mirabilis      ANA PAULA      Ampicillin Susceptible      Ampicillin + Sulbactam Susceptible      Cefepime Susceptible      Ceftazidime Susceptible      Ceftriaxone Susceptible      Gentamicin Susceptible      Levofloxacin Susceptible      Piperacillin + Tazobactam Susceptible      Tetracycline Resistant      Trimethoprim + Sulfamethoxazole Susceptible                       Susceptibility Comments       Proteus mirabilis    Cefazolin sensitivity will not be reported for Enterobacteriaceae in non-urine isolates. If cefazolin is preferred, please call the microbiology lab to request an E-test.  With the exception of urinary-sourced infections, aminoglycosides should not be used as monotherapy.                       Danisha Sood, Bipin  10/26/2023 12:45 EDT

## 2023-10-26 LAB
BACTERIA SPEC AEROBE CULT: ABNORMAL
BACTERIA SPEC AEROBE CULT: ABNORMAL
GRAM STN SPEC: ABNORMAL
GRAM STN SPEC: ABNORMAL

## 2025-03-28 NOTE — Clinical Note
[facility] EMERGENCY DEPARTMENT  [address] [place] [address]  Phone: [phone]    Keagan Gregg was seen and treated in our emergency department on 4/26/2023.  He may return to work on 04/27/2023.         Thank you for choosing [facility].    Moses Melissa PA      
[facility] EMERGENCY DEPARTMENT  [address] [place] [address]  Phone: [phone]    Keagan Gregg was seen and treated in our emergency department on 4/26/2023.  He may return to work on 04/27/2023.         Thank you for choosing [facility].    M[facility] Melissa PA      
Event Note